# Patient Record
Sex: MALE | Race: WHITE | Employment: UNEMPLOYED | URBAN - METROPOLITAN AREA
[De-identification: names, ages, dates, MRNs, and addresses within clinical notes are randomized per-mention and may not be internally consistent; named-entity substitution may affect disease eponyms.]

---

## 2021-05-04 ENCOUNTER — OFFICE VISIT (OUTPATIENT)
Dept: INTERNAL MEDICINE CLINIC | Age: 31
End: 2021-05-04
Payer: MEDICAID

## 2021-05-04 VITALS
OXYGEN SATURATION: 96 % | HEIGHT: 73 IN | TEMPERATURE: 98 F | HEART RATE: 83 BPM | BODY MASS INDEX: 25.74 KG/M2 | RESPIRATION RATE: 15 BRPM | DIASTOLIC BLOOD PRESSURE: 64 MMHG | SYSTOLIC BLOOD PRESSURE: 125 MMHG | WEIGHT: 194.25 LBS

## 2021-05-04 DIAGNOSIS — Z00.00 WELL ADULT EXAM: ICD-10-CM

## 2021-05-04 DIAGNOSIS — R73.9 HYPERGLYCEMIA: ICD-10-CM

## 2021-05-04 DIAGNOSIS — R25.2 LEG CRAMPS: ICD-10-CM

## 2021-05-04 DIAGNOSIS — E55.9 VITAMIN D DEFICIENCY: ICD-10-CM

## 2021-05-04 DIAGNOSIS — R11.10 DRY HEAVES: Primary | ICD-10-CM

## 2021-05-04 DIAGNOSIS — F90.0 ATTENTION DEFICIT HYPERACTIVITY DISORDER (ADHD), PREDOMINANTLY INATTENTIVE TYPE: ICD-10-CM

## 2021-05-04 DIAGNOSIS — Z11.59 NEED FOR HEPATITIS C SCREENING TEST: ICD-10-CM

## 2021-05-04 DIAGNOSIS — R10.9 ABDOMINAL CRAMPING: ICD-10-CM

## 2021-05-04 DIAGNOSIS — E53.8 B12 DEFICIENCY: ICD-10-CM

## 2021-05-04 PROCEDURE — 99204 OFFICE O/P NEW MOD 45 MIN: CPT | Performed by: INTERNAL MEDICINE

## 2021-05-04 RX ORDER — DEXTROAMPHETAMINE SACCHARATE, AMPHETAMINE ASPARTATE MONOHYDRATE, DEXTROAMPHETAMINE SULFATE AND AMPHETAMINE SULFATE 5; 5; 5; 5 MG/1; MG/1; MG/1; MG/1
20 CAPSULE, EXTENDED RELEASE ORAL DAILY
Qty: 30 CAP | Refills: 0 | Status: SHIPPED | OUTPATIENT
Start: 2021-05-04 | End: 2021-08-04 | Stop reason: CLARIF

## 2021-05-04 RX ORDER — BUPRENORPHINE AND NALOXONE 8; 2 MG/1; MG/1
FILM, SOLUBLE BUCCAL; SUBLINGUAL DAILY
COMMUNITY

## 2021-05-04 RX ORDER — DEXTROAMPHETAMINE SACCHARATE, AMPHETAMINE ASPARTATE MONOHYDRATE, DEXTROAMPHETAMINE SULFATE AND AMPHETAMINE SULFATE 5; 5; 5; 5 MG/1; MG/1; MG/1; MG/1
20 CAPSULE, EXTENDED RELEASE ORAL DAILY
Qty: 30 CAP | Refills: 0 | Status: SHIPPED | OUTPATIENT
Start: 2021-07-03 | End: 2021-08-04 | Stop reason: SDUPTHER

## 2021-05-04 RX ORDER — VARENICLINE TARTRATE 25 MG
KIT ORAL
Qty: 1 DOSE PACK | Refills: 0 | Status: SHIPPED | OUTPATIENT
Start: 2021-05-04 | End: 2021-08-07 | Stop reason: ALTCHOICE

## 2021-05-04 RX ORDER — DEXTROAMPHETAMINE SACCHARATE, AMPHETAMINE ASPARTATE MONOHYDRATE, DEXTROAMPHETAMINE SULFATE AND AMPHETAMINE SULFATE 5; 5; 5; 5 MG/1; MG/1; MG/1; MG/1
20 CAPSULE, EXTENDED RELEASE ORAL DAILY
Qty: 30 CAP | Refills: 0 | Status: SHIPPED | OUTPATIENT
Start: 2021-06-03 | End: 2021-08-04 | Stop reason: SDUPTHER

## 2021-05-04 RX ORDER — DICYCLOMINE HYDROCHLORIDE 10 MG/1
10 CAPSULE ORAL
Qty: 30 CAP | Refills: 1 | Status: SHIPPED | OUTPATIENT
Start: 2021-05-04

## 2021-05-04 RX ORDER — VARENICLINE TARTRATE 1 MG/1
1 TABLET, FILM COATED ORAL 2 TIMES DAILY
Qty: 60 TAB | Refills: 5 | Status: SHIPPED | OUTPATIENT
Start: 2021-05-04 | End: 2021-08-07 | Stop reason: ALTCHOICE

## 2021-05-04 NOTE — PROGRESS NOTES
RM 13    Chief Complaint   Patient presents with    New Patient    Establish Care       1. Have you been to the ER, urgent care clinic since your last visit? Hospitalized since your last visit? No    2. Have you seen or consulted any other health care providers outside of the [address] [place] [address] since your last visit? Include any pap smears or colon screening. No    Health Maintenance Due   Topic Date Due    Hepatitis C Screening  Never done    Pneumococcal 0-64 years (1 of 1 - PPSV23) Never done    COVID-19 Vaccine (1) Never done    DTaP/Tdap/Td series (1 - Tdap) Never done       Abuse Screening Questionnaire 5/4/2021   Do you ever feel afraid of your partner? N   Are you in a relationship with someone who physically or mentally threatens you? N   Is it safe for you to go home? Y       3 most recent PHQ Screens 5/4/2021   Little interest or pleasure in doing things Not at all   Feeling down, depressed, irritable, or hopeless Not at all   Total Score PHQ 2 0       No flowsheet data found.

## 2021-05-04 NOTE — PROGRESS NOTES
HPI:  established patient  Presents to est care    A couple of months of \"dry heaving\" in the AM    Awakens at night with abd cramping    Poor weight gain despite protein supplement shakes    Varicose veins    Follows with pain management and on suboxone  +hx heroin abuse - none x 3-4 yrs    No NSAIDs    Denies acid reflux    PPD smoker since 12 yo  Pt interested in stop    Pt has ADHD - long standing since childhood. Poor attention to task  Easily distracted. Impaired at work due to ADHD    Pt has been on depakote and dx Bipolar d/o      Past medical, Social, and Family history reviewed    Prior to Admission medications    Medication Sig Start Date End Date Taking? Authorizing Provider   buprenorphine-naloxone (Suboxone) 8-2 mg film sublingaul film by SubLINGual route daily. Yes Provider, Historical   hydrocodone-acetaminophen (NORCO) 5-325 mg per tablet Take 1 tablet by mouth every six (6) hours as needed for Pain. Patient not taking: Reported on 5/4/2021 9/21/14   Alex Brown PA-C          ROS  Complete ROS reviewed and negative or stable except as noted in HPI. Physical Exam  Vitals signs and nursing note reviewed. Constitutional:       General: He is not in acute distress. HENT:      Head: Normocephalic and atraumatic. Mouth/Throat:      Pharynx: No oropharyngeal exudate. Eyes:      General: No scleral icterus. Pupils: Pupils are equal, round, and reactive to light. Neck:      Musculoskeletal: Normal range of motion and neck supple. Thyroid: No thyromegaly. Vascular: No JVD. Cardiovascular:      Rate and Rhythm: Normal rate and regular rhythm. Heart sounds: Normal heart sounds. No murmur. No friction rub. No gallop. Pulmonary:      Effort: Pulmonary effort is normal. No respiratory distress. Breath sounds: Normal breath sounds. No wheezing or rales. Abdominal:      General: Bowel sounds are normal. There is no distension.       Palpations: Abdomen is soft.      Tenderness: There is no abdominal tenderness. Musculoskeletal: Normal range of motion. Lymphadenopathy:      Cervical: No cervical adenopathy. Skin:     General: Skin is warm. Findings: No rash. Neurological:      Mental Status: He is alert and oriented to person, place, and time. Motor: No abnormal muscle tone. Coordination: Coordination normal.           Prior labs reviewed. Assessment/Plan:    ICD-10-CM ICD-9-CM    1. Dry heaves  R11.10 787.03 REFERRAL TO GASTROENTEROLOGY      CBC WITH AUTOMATED DIFF      IRON PROFILE      FERRITIN      CBC WITH AUTOMATED DIFF      IRON PROFILE      FERRITIN   2. Leg cramps  R25.2 729.82 CK      CBC WITH AUTOMATED DIFF      TSH 3RD GENERATION      T4, FREE      SED RATE (ESR)      IRON PROFILE      FERRITIN      CK      CBC WITH AUTOMATED DIFF      TSH 3RD GENERATION      T4, FREE      SED RATE (ESR)      IRON PROFILE      FERRITIN   3. Abdominal cramping  R10.9 789.00 REFERRAL TO GASTROENTEROLOGY      IRON PROFILE      FERRITIN      IRON PROFILE      FERRITIN      LIPASE      LIPASE   4. Attention deficit hyperactivity disorder (ADHD), predominantly inattentive type  F90.0 314.00 amphetamine-dextroamphetamine XR (ADDERALL XR) 20 mg XR capsule      amphetamine-dextroamphetamine XR (ADDERALL XR) 20 mg XR capsule      amphetamine-dextroamphetamine XR (ADDERALL XR) 20 mg XR capsule   5. Well adult exam  Z00.00 V70.0 HEMOGLOBIN A1C WITH EAG      LIPID PANEL      METABOLIC PANEL, COMPREHENSIVE      HEMOGLOBIN A1C WITH EAG      LIPID PANEL      METABOLIC PANEL, COMPREHENSIVE   6. Hyperglycemia  R73.9 790.29 HEMOGLOBIN A1C WITH EAG      HEMOGLOBIN A1C WITH EAG   7. Need for hepatitis C screening test  Z11.59 V73.89 HCV AB W/RFLX TO MARCO      HCV AB W/RFLX TO MARCO   8. Vitamin D deficiency  E55.9 268.9 VITAMIN D, 25 HYDROXY      VITAMIN D, 25 HYDROXY   9.  B12 deficiency  E53.8 266.2 VITAMIN B12      VITAMIN B12     Follow-up and Dispositions · Return in about 3 months (around 8/4/2021), or if symptoms worsen or fail to improve, for ADHD. results and schedule of future studies reviewed with patient  reviewed diet, exercise and weight    very strongly urged to quit smoking to reduce cardiovascular risk  cardiovascular risk and specific lipid/LDL goals reviewed  reviewed medications and side effects in detail    Ref vascular re: leg pain and varicose veins  Ref GI  Start adderall XR 20 mg daily  Defer mood management to psych  Check on Tdap   Consider pneumovax 23 at follow up        An electronic signature was used to authenticate this note.   -- Ritika Nicolas MD

## 2021-05-07 ENCOUNTER — TRANSCRIBE ORDER (OUTPATIENT)
Dept: NEUROLOGY | Age: 31
End: 2021-05-07

## 2021-05-10 ENCOUNTER — APPOINTMENT (OUTPATIENT)
Dept: INTERNAL MEDICINE CLINIC | Age: 31
End: 2021-05-10

## 2021-05-12 ENCOUNTER — TELEPHONE (OUTPATIENT)
Dept: INTERNAL MEDICINE CLINIC | Age: 31
End: 2021-05-12

## 2021-05-12 DIAGNOSIS — F90.0 ATTENTION DEFICIT HYPERACTIVITY DISORDER (ADHD), PREDOMINANTLY INATTENTIVE TYPE: ICD-10-CM

## 2021-05-12 DIAGNOSIS — F90.0 ATTENTION DEFICIT HYPERACTIVITY DISORDER (ADHD), PREDOMINANTLY INATTENTIVE TYPE: Primary | ICD-10-CM

## 2021-05-12 NOTE — TELEPHONE ENCOUNTER
Sergio Mackenzie called need a prior authorization for Adderall 20 mg pleas call them at [phone] Ref.  # [id number]

## 2021-05-12 NOTE — TELEPHONE ENCOUNTER
----- Message from Cyndy Perez sent at 5/12/2021 11:05 AM EDT -----  Regarding: Dr. Homer Pallas first and last name: Abel Austen [facility])      Reason for call: Requesting a call concerning PA for RX amphetamine-dextroamphetamine XR (ADDERALL XR) 20 mg XR capsule      Callback required yes/no and why: Yes      Best contact number(s): [phone] Ref #[id number]      Details to clarify the request:      Cyndy Perez

## 2021-05-12 NOTE — TELEPHONE ENCOUNTER
Spoke with Rupert MONREAL from [employer] and was informed that a PA was needed. PA sent to insurance today.   Key-[id number] PA Case ID: [id number] - Rx #: [id number]

## 2021-05-14 ENCOUNTER — DOCUMENTATION ONLY (OUTPATIENT)
Dept: INTERNAL MEDICINE CLINIC | Age: 31
End: 2021-05-14

## 2021-05-14 LAB
25(OH)D3+25(OH)D2 SERPL-MCNC: 30.8 NG/ML (ref 30–100)
ALBUMIN SERPL-MCNC: 4.8 G/DL (ref 4.1–5.2)
ALBUMIN/GLOB SERPL: 2.5 {RATIO} (ref 1.2–2.2)
ALP SERPL-CCNC: 70 IU/L (ref 39–117)
ALT SERPL-CCNC: 9 IU/L (ref 0–44)
AST SERPL-CCNC: 15 IU/L (ref 0–40)
BASOPHILS # BLD AUTO: 0 X10E3/UL (ref 0–0.2)
BASOPHILS NFR BLD AUTO: 1 %
BILIRUB SERPL-MCNC: 0.4 MG/DL (ref 0–1.2)
BUN SERPL-MCNC: 12 MG/DL (ref 6–20)
BUN/CREAT SERPL: 17 (ref 9–20)
CALCIUM SERPL-MCNC: 10 MG/DL (ref 8.7–10.2)
CHLORIDE SERPL-SCNC: 101 MMOL/L (ref 96–106)
CHOLEST SERPL-MCNC: 135 MG/DL (ref 100–199)
CK SERPL-CCNC: 91 U/L (ref 49–439)
CO2 SERPL-SCNC: 24 MMOL/L (ref 20–29)
CREAT SERPL-MCNC: 0.7 MG/DL (ref 0.76–1.27)
EOSINOPHIL # BLD AUTO: 0.2 X10E3/UL (ref 0–0.4)
EOSINOPHIL NFR BLD AUTO: 2 %
ERYTHROCYTE [DISTWIDTH] IN BLOOD BY AUTOMATED COUNT: 13 % (ref 11.6–15.4)
ERYTHROCYTE [SEDIMENTATION RATE] IN BLOOD BY WESTERGREN METHOD: 2 MM/HR (ref 0–15)
EST. AVERAGE GLUCOSE BLD GHB EST-MCNC: 120 MG/DL
FERRITIN SERPL-MCNC: 59 NG/ML (ref 30–400)
GLOBULIN SER CALC-MCNC: 1.9 G/DL (ref 1.5–4.5)
GLUCOSE SERPL-MCNC: 104 MG/DL (ref 65–99)
HBA1C MFR BLD: 5.8 % (ref 4.8–5.6)
HCT VFR BLD AUTO: 47.1 % (ref 37.5–51)
HCV AB S/CO SERPL IA: >11 S/CO RATIO (ref 0–0.9)
HCV RNA SERPL QL NAA+PROBE: NEGATIVE
HDLC SERPL-MCNC: 59 MG/DL
HGB BLD-MCNC: 15.6 G/DL (ref 13–17.7)
IMM GRANULOCYTES # BLD AUTO: 0 X10E3/UL (ref 0–0.1)
IMM GRANULOCYTES NFR BLD AUTO: 0 %
INTERPRETATION: NORMAL
IRON SATN MFR SERPL: 31 % (ref 15–55)
IRON SERPL-MCNC: 122 UG/DL (ref 38–169)
LDLC SERPL CALC-MCNC: 61 MG/DL (ref 0–99)
LIPASE SERPL-CCNC: 22 U/L (ref 13–78)
LYMPHOCYTES # BLD AUTO: 2.4 X10E3/UL (ref 0.7–3.1)
LYMPHOCYTES NFR BLD AUTO: 28 %
MCH RBC QN AUTO: 30.8 PG (ref 26.6–33)
MCHC RBC AUTO-ENTMCNC: 33.1 G/DL (ref 31.5–35.7)
MCV RBC AUTO: 93 FL (ref 79–97)
MONOCYTES # BLD AUTO: 0.5 X10E3/UL (ref 0.1–0.9)
MONOCYTES NFR BLD AUTO: 6 %
NEUTROPHILS # BLD AUTO: 5.3 X10E3/UL (ref 1.4–7)
NEUTROPHILS NFR BLD AUTO: 63 %
PLATELET # BLD AUTO: 159 X10E3/UL (ref 150–450)
POTASSIUM SERPL-SCNC: 4.7 MMOL/L (ref 3.5–5.2)
PROT SERPL-MCNC: 6.7 G/DL (ref 6–8.5)
RBC # BLD AUTO: 5.06 X10E6/UL (ref 4.14–5.8)
SODIUM SERPL-SCNC: 141 MMOL/L (ref 134–144)
T4 FREE SERPL-MCNC: 0.94 NG/DL (ref 0.82–1.77)
TIBC SERPL-MCNC: 399 UG/DL (ref 250–450)
TRIGL SERPL-MCNC: 78 MG/DL (ref 0–149)
TSH SERPL DL<=0.005 MIU/L-ACNC: 2.28 UIU/ML (ref 0.45–4.5)
UIBC SERPL-MCNC: 277 UG/DL (ref 111–343)
VIT B12 SERPL-MCNC: 448 PG/ML (ref 232–1245)
VLDLC SERPL CALC-MCNC: 15 MG/DL (ref 5–40)
WBC # BLD AUTO: 8.5 X10E3/UL (ref 3.4–10.8)

## 2021-05-20 ENCOUNTER — TELEPHONE (OUTPATIENT)
Dept: INTERNAL MEDICINE CLINIC | Age: 31
End: 2021-05-20

## 2021-05-20 NOTE — TELEPHONE ENCOUNTER
Called pt and identified with name and  advised pt that a drug screen is required to get approved for Detrmp. Pt informed me that he received lab order for urine drug screen in the mail today. Provided pt with [facility] phone number and hours via email so pt can schedule a weekend appt to have drug screening completed. Information sent to [email or web address]

## 2021-08-04 ENCOUNTER — OFFICE VISIT (OUTPATIENT)
Dept: INTERNAL MEDICINE CLINIC | Age: 31
End: 2021-08-04
Payer: MEDICAID

## 2021-08-04 VITALS
RESPIRATION RATE: 20 BRPM | HEIGHT: 73 IN | OXYGEN SATURATION: 97 % | HEART RATE: 79 BPM | SYSTOLIC BLOOD PRESSURE: 122 MMHG | BODY MASS INDEX: 25.63 KG/M2 | DIASTOLIC BLOOD PRESSURE: 66 MMHG | TEMPERATURE: 98 F

## 2021-08-04 DIAGNOSIS — R73.02 IGT (IMPAIRED GLUCOSE TOLERANCE): ICD-10-CM

## 2021-08-04 DIAGNOSIS — Z00.00 ROUTINE PHYSICAL EXAMINATION: ICD-10-CM

## 2021-08-04 DIAGNOSIS — F90.0 ATTENTION DEFICIT HYPERACTIVITY DISORDER (ADHD), PREDOMINANTLY INATTENTIVE TYPE: Primary | ICD-10-CM

## 2021-08-04 PROCEDURE — 99214 OFFICE O/P EST MOD 30 MIN: CPT | Performed by: INTERNAL MEDICINE

## 2021-08-04 RX ORDER — DEXTROAMPHETAMINE SACCHARATE, AMPHETAMINE ASPARTATE MONOHYDRATE, DEXTROAMPHETAMINE SULFATE AND AMPHETAMINE SULFATE 5; 5; 5; 5 MG/1; MG/1; MG/1; MG/1
20 CAPSULE, EXTENDED RELEASE ORAL DAILY
Qty: 30 CAPSULE | Refills: 0 | Status: SHIPPED | OUTPATIENT
Start: 2021-10-21 | End: 2021-11-20

## 2021-08-04 RX ORDER — CEPHALEXIN 500 MG/1
CAPSULE ORAL
COMMUNITY
Start: 2021-06-10

## 2021-08-04 RX ORDER — DEXTROAMPHETAMINE SACCHARATE, AMPHETAMINE ASPARTATE, DEXTROAMPHETAMINE SULFATE AND AMPHETAMINE SULFATE 2.5; 2.5; 2.5; 2.5 MG/1; MG/1; MG/1; MG/1
10 TABLET ORAL DAILY
Qty: 30 TABLET | Refills: 0 | Status: SHIPPED | OUTPATIENT
Start: 2021-08-22 | End: 2021-09-20

## 2021-08-04 RX ORDER — PANTOPRAZOLE SODIUM 40 MG/1
40 TABLET, DELAYED RELEASE ORAL DAILY
Qty: 30 TABLET | Refills: 5 | Status: SHIPPED | OUTPATIENT
Start: 2021-08-04

## 2021-08-04 RX ORDER — DEXTROAMPHETAMINE SACCHARATE, AMPHETAMINE ASPARTATE MONOHYDRATE, DEXTROAMPHETAMINE SULFATE AND AMPHETAMINE SULFATE 5; 5; 5; 5 MG/1; MG/1; MG/1; MG/1
20 CAPSULE, EXTENDED RELEASE ORAL DAILY
Qty: 30 CAPSULE | Refills: 0 | Status: SHIPPED | OUTPATIENT
Start: 2021-09-21 | End: 2021-10-20

## 2021-08-04 RX ORDER — DEXTROAMPHETAMINE SACCHARATE, AMPHETAMINE ASPARTATE, DEXTROAMPHETAMINE SULFATE AND AMPHETAMINE SULFATE 5; 5; 5; 5 MG/1; MG/1; MG/1; MG/1
20 TABLET ORAL DAILY
COMMUNITY
End: 2021-08-04 | Stop reason: CLARIF

## 2021-08-04 RX ORDER — DEXTROAMPHETAMINE SACCHARATE, AMPHETAMINE ASPARTATE, DEXTROAMPHETAMINE SULFATE AND AMPHETAMINE SULFATE 2.5; 2.5; 2.5; 2.5 MG/1; MG/1; MG/1; MG/1
10 TABLET ORAL DAILY
Qty: 30 TABLET | Refills: 0 | Status: SHIPPED | OUTPATIENT
Start: 2021-09-21 | End: 2021-10-20

## 2021-08-04 RX ORDER — DEXTROAMPHETAMINE SACCHARATE, AMPHETAMINE ASPARTATE, DEXTROAMPHETAMINE SULFATE AND AMPHETAMINE SULFATE 2.5; 2.5; 2.5; 2.5 MG/1; MG/1; MG/1; MG/1
10 TABLET ORAL DAILY
Qty: 30 TABLET | Refills: 0 | Status: SHIPPED | OUTPATIENT
Start: 2021-10-21 | End: 2021-11-19

## 2021-08-04 RX ORDER — DEXTROAMPHETAMINE SACCHARATE, AMPHETAMINE ASPARTATE MONOHYDRATE, DEXTROAMPHETAMINE SULFATE AND AMPHETAMINE SULFATE 5; 5; 5; 5 MG/1; MG/1; MG/1; MG/1
20 CAPSULE, EXTENDED RELEASE ORAL DAILY
Qty: 30 CAPSULE | Refills: 0 | Status: SHIPPED | OUTPATIENT
Start: 2021-08-22 | End: 2021-09-20

## 2021-08-04 NOTE — PROGRESS NOTES
RM: 16  patient is not fasting    Chief Complaint   Patient presents with    Medication Evaluation     3 month f/u    Behavioral Problem      Visit Vitals  /66 (BP 1 Location: Right arm, BP Patient Position: Sitting, BP Cuff Size: Adult)   Pulse 79   Temp 98 °F (36.7 °C) (Oral)   Resp 20   Ht 6' 1\" (1.854 m)   SpO2 97%   BMI 25.63 kg/m²     Recent Travel Screening and Travel History documentation     Travel Screening     Question   Response    In the last month, have you been in contact with someone who was confirmed or suspected to have Coronavirus / COVID-19? No / Unsure    Have you had a COVID-19 viral test in the last 14 days? No    Do you have any of the following new or worsening symptoms? None of these    Have you traveled internationally or domestically in the last month? No      Travel History   Travel since 07/04/21     No documented travel since 07/04/21                   3 most recent PHQ Screens 8/4/2021   Little interest or pleasure in doing things Several days   Feeling down, depressed, irritable, or hopeless Nearly every day   Total Score PHQ 2 4   Trouble falling or staying asleep, or sleeping too much Several days   Feeling tired or having little energy Several days   Poor appetite, weight loss, or overeating Several days   Feeling bad about yourself - or that you are a failure or have let yourself or your family down Not at all   Trouble concentrating on things such as school, work, reading, or watching TV Several days   Moving or speaking so slowly that other people could have noticed; or the opposite being so fidgety that others notice Not at all   Thoughts of being better off dead, or hurting yourself in some way Not at all   PHQ 9 Score 8   How difficult have these problems made it for you to do your work, take care of your home and get along with others Somewhat difficult            1. Have you been to the ER, urgent care clinic since your last visit?   Hospitalized since your last visit? No    2. Have you seen or consulted any other health care providers outside of the [address] [place] [address] since your last visit? Include any pap smears or colon screening. No     Health Maintenance Due   Topic Date Due    Pneumococcal 0-64 years (1 of 2 - PPSV23) Never done    COVID-19 Vaccine (1) Never done    DTaP/Tdap/Td series (1 - Tdap) Never done        No flowsheet data found.

## 2021-08-04 NOTE — PROGRESS NOTES
HPI:  established patient  Presents for f/u ADHD    Pt filled adderall XR though no insurance approval  Helps him focus and be more productive. But, pt notices it wears off in the afternoon  Works from 6 a to 6 p and takes med around 5:30 am    Pt still dry heaving    Pt has reduced sugars based on the IGT dx    Past medical, Social, and Family history reviewed    Prior to Admission medications    Medication Sig Start Date End Date Taking? Authorizing Provider   cephALEXin (KEFLEX) 500 mg capsule TAKE 1 CAPSULE BY MOUTH 3 TIMES A DAY 6/10/21  Yes Provider, Historical   dextroamphetamine-amphetamine (AdderalL) 20 mg tablet Take 20 mg by mouth daily. Yes Provider, Historical   buprenorphine-naloxone (Suboxone) 8-2 mg film sublingaul film by SubLINGual route daily. Yes Provider, Historical   dicyclomine (BENTYL) 10 mg capsule Take 1 Cap by mouth three (3) times daily as needed for Abdominal Cramps or Cramping. 5/4/21  Yes Johanna Clark MD   varenicline (CHANTIX STARTER DANDRE) 0.5 mg (11)- 1 mg (42) DsPk Per dose pack  Patient not taking: Reported on 8/4/2021 5/4/21   Johanna Clark MD   varenicline (CHANTIX) 1 mg tablet Take 1 Tab by mouth two (2) times a day. Patient not taking: Reported on 8/4/2021 5/4/21   Johanna Clark MD   hydrocodone-acetaminophen [facility]) 5-325 mg per tablet Take 1 tablet by mouth every six (6) hours as needed for Pain. Patient not taking: Reported on 5/4/2021 9/21/14   Juana Jessica PA-C          ROS  Complete ROS reviewed and negative or stable except as noted in HPI. Physical Exam  Vitals and nursing note reviewed. Constitutional:       General: He is not in acute distress. HENT:      Head: Normocephalic and atraumatic. Mouth/Throat:      Pharynx: No oropharyngeal exudate. Eyes:      General: No scleral icterus. Pupils: Pupils are equal, round, and reactive to light. Neck:      Thyroid: No thyromegaly. Vascular: No JVD.    Cardiovascular: Rate and Rhythm: Normal rate and regular rhythm. Heart sounds: Normal heart sounds. No murmur heard. No friction rub. No gallop. Pulmonary:      Effort: Pulmonary effort is normal. No respiratory distress. Breath sounds: Normal breath sounds. No wheezing or rales. Abdominal:      General: Bowel sounds are normal. There is no distension. Palpations: Abdomen is soft. Tenderness: There is no abdominal tenderness. Musculoskeletal:         General: Normal range of motion. Cervical back: Normal range of motion and neck supple. Lymphadenopathy:      Cervical: No cervical adenopathy. Skin:     General: Skin is warm. Findings: No rash. Neurological:      Mental Status: He is alert and oriented to person, place, and time. Motor: No abnormal muscle tone. Coordination: Coordination normal.           Prior labs reviewed. Reviewed Hep C Ab + but RNA negative - no tx needed. Assessment/Plan:    ICD-10-CM ICD-9-CM    1. Attention deficit hyperactivity disorder (ADHD), predominantly inattentive type  F90.0 314.00 10-DRUG SCREEN W/CONF      amphetamine-dextroamphetamine XR (ADDERALL XR) 20 mg XR capsule      amphetamine-dextroamphetamine XR (ADDERALL XR) 20 mg XR capsule      amphetamine-dextroamphetamine XR (ADDERALL XR) 20 mg XR capsule      dextroamphetamine-amphetamine (ADDERALL) 10 mg tablet      dextroamphetamine-amphetamine (ADDERALL) 10 mg tablet      dextroamphetamine-amphetamine (ADDERALL) 10 mg tablet      10-DRUG SCREEN W/CONF   2. Routine physical examination  Z00.00 V70.0    3. IGT (impaired glucose tolerance)  R73.02 790.22      Follow-up and Dispositions    · Return in about 3 months (around 11/4/2021), or if symptoms worsen or fail to improve, for ADHD.        results and schedule of future studies reviewed with patient  reviewed diet, exercise and weight    cardiovascular risk and specific lipid/LDL goals reviewed  reviewed medications and side effects in detail    Trial PPI  Add adderall IR for afternoon use        An electronic signature was used to authenticate this note.   -- Livan Llamas MD

## 2021-08-16 LAB
AMPHET UR CFM-MCNC: 4096 NG/ML
AMPHET UR QL CFM: POSITIVE
AMPHETAMINES UR QL SCN: NORMAL NG/ML
AMPHETAMINES UR QL: POSITIVE
BARBITURATES UR QL SCN: NEGATIVE NG/ML
BENZODIAZ UR QL SCN: NEGATIVE NG/ML
BZE UR QL SCN: NEGATIVE NG/ML
CANNABINOIDS UR QL CFM: POSITIVE
CANNABINOIDS UR QL SCN: NORMAL NG/ML
CREAT UR-MCNC: 186.8 MG/DL (ref 20–300)
METHADONE UR QL SCN: NEGATIVE NG/ML
METHAMPHET UR QL CFM: NEGATIVE
OPIATES UR QL SCN: NEGATIVE NG/ML
OXYCODONE+OXYMORPHONE UR QL SCN: NEGATIVE NG/ML
PCP UR QL: NEGATIVE NG/ML
PH UR: 6.3 [PH] (ref 4.5–8.9)
PLEASE NOTE:, 733157: NORMAL
PROPOXYPH UR QL SCN: NEGATIVE NG/ML
THC UR CFM-MCNC: 37 NG/ML

## 2021-11-16 ENCOUNTER — VIRTUAL VISIT (OUTPATIENT)
Dept: INTERNAL MEDICINE CLINIC | Age: 31
End: 2021-11-16
Payer: MEDICAID

## 2021-11-16 DIAGNOSIS — K29.70 GASTRITIS WITHOUT BLEEDING, UNSPECIFIED CHRONICITY, UNSPECIFIED GASTRITIS TYPE: ICD-10-CM

## 2021-11-16 DIAGNOSIS — F90.0 ATTENTION DEFICIT HYPERACTIVITY DISORDER (ADHD), PREDOMINANTLY INATTENTIVE TYPE: Primary | ICD-10-CM

## 2021-11-16 PROCEDURE — 99214 OFFICE O/P EST MOD 30 MIN: CPT | Performed by: INTERNAL MEDICINE

## 2021-11-16 RX ORDER — DEXTROAMPHETAMINE SACCHARATE, AMPHETAMINE ASPARTATE, DEXTROAMPHETAMINE SULFATE AND AMPHETAMINE SULFATE 2.5; 2.5; 2.5; 2.5 MG/1; MG/1; MG/1; MG/1
10 TABLET ORAL DAILY
Qty: 30 TABLET | Refills: 0 | Status: SHIPPED | OUTPATIENT
Start: 2022-01-15 | End: 2022-02-13

## 2021-11-16 RX ORDER — DEXTROAMPHETAMINE SACCHARATE, AMPHETAMINE ASPARTATE MONOHYDRATE, DEXTROAMPHETAMINE SULFATE AND AMPHETAMINE SULFATE 5; 5; 5; 5 MG/1; MG/1; MG/1; MG/1
20 CAPSULE, EXTENDED RELEASE ORAL DAILY
Qty: 30 CAPSULE | Refills: 0 | Status: SHIPPED | OUTPATIENT
Start: 2022-01-15 | End: 2022-02-13

## 2021-11-16 RX ORDER — DEXTROAMPHETAMINE SACCHARATE, AMPHETAMINE ASPARTATE, DEXTROAMPHETAMINE SULFATE AND AMPHETAMINE SULFATE 2.5; 2.5; 2.5; 2.5 MG/1; MG/1; MG/1; MG/1
10 TABLET ORAL DAILY
Qty: 30 TABLET | Refills: 0 | Status: SHIPPED | OUTPATIENT
Start: 2021-12-16 | End: 2022-01-14

## 2021-11-16 RX ORDER — DEXTROAMPHETAMINE SACCHARATE, AMPHETAMINE ASPARTATE MONOHYDRATE, DEXTROAMPHETAMINE SULFATE AND AMPHETAMINE SULFATE 5; 5; 5; 5 MG/1; MG/1; MG/1; MG/1
20 CAPSULE, EXTENDED RELEASE ORAL DAILY
Qty: 30 CAPSULE | Refills: 0 | Status: SHIPPED | OUTPATIENT
Start: 2021-12-16 | End: 2022-01-14

## 2021-11-16 RX ORDER — DEXTROAMPHETAMINE SACCHARATE, AMPHETAMINE ASPARTATE MONOHYDRATE, DEXTROAMPHETAMINE SULFATE AND AMPHETAMINE SULFATE 5; 5; 5; 5 MG/1; MG/1; MG/1; MG/1
20 CAPSULE, EXTENDED RELEASE ORAL DAILY
Qty: 30 CAPSULE | Refills: 0 | Status: SHIPPED | OUTPATIENT
Start: 2021-11-16 | End: 2021-12-16

## 2021-11-16 RX ORDER — DEXTROAMPHETAMINE SACCHARATE, AMPHETAMINE ASPARTATE, DEXTROAMPHETAMINE SULFATE AND AMPHETAMINE SULFATE 2.5; 2.5; 2.5; 2.5 MG/1; MG/1; MG/1; MG/1
10 TABLET ORAL DAILY
Qty: 30 TABLET | Refills: 0 | Status: SHIPPED | OUTPATIENT
Start: 2021-11-16 | End: 2021-12-15

## 2021-11-16 NOTE — PROGRESS NOTES
[phone]    Chief Complaint   Patient presents with    Medication Check     f/u     1. Have you been to the ER, urgent care clinic since your last visit? Hospitalized since your last visit? Yes Where: patient first laceration to hand    2. Have you seen or consulted any other health care providers outside of the [address] [place] [address] since your last visit? Include any pap smears or colon screening.  Yes Where: see above

## 2021-11-16 NOTE — PROGRESS NOTES
Maria Isabel Zuñiga (: 1990) is a 32 y.o. male, established patient, here for evaluation of the following chief complaint(s)--see below:    Maria Isabel Zuñiga is a 32 y.o. male who was seen by synchronous (real-time) audio-video technology on 2021. Consent: Maria Isabel Zuñiga, who was seen by synchronous (real-time) audio-video technology, and/or his healthcare decision maker, is aware that this patient-initiated, Telehealth encounter on 2021 is a billable service, with coverage as determined by his insurance carrier. He is aware that he may receive a bill and has provided verbal consent to proceed: Yes. I was in the office while conducting this encounter. Assessment & Plan:   Diagnoses and all orders for this visit:      ICD-10-CM ICD-9-CM    1. Attention deficit hyperactivity disorder (ADHD), predominantly inattentive type  F90.0 314.00 amphetamine-dextroamphetamine XR (ADDERALL XR) 20 mg XR capsule      amphetamine-dextroamphetamine XR (ADDERALL XR) 20 mg XR capsule      amphetamine-dextroamphetamine XR (ADDERALL XR) 20 mg XR capsule      dextroamphetamine-amphetamine (ADDERALL) 10 mg tablet      dextroamphetamine-amphetamine (ADDERALL) 10 mg tablet      dextroamphetamine-amphetamine (ADDERALL) 10 mg tablet   2. Gastritis without bleeding, unspecified chronicity, unspecified gastritis type  K29.70 535.50      Follow-up and Dispositions    · Return in about 3 months (around 2022), or if symptoms worsen or fail to improve, for ADHD. results and schedule of future studies reviewed with patient  reviewed diet, exercise and weight   reviewed medications and side effects in detail     Continue current medications   Resume PPI if GI sx recur      AVS:  []  Available to patient in [employer] after visit signed. [x]  Mailed to patient after visit. []  Not sent to patient after visit.          Subjective:   Maria Isabel Zuñiga was seen for:  Chief Complaint   Patient presents with   Ardyth Moritz Medication Check     f/u       Notes:    Took PPI for a month or 2 then sx improved and med stopped    adderall XR + IR working well.    +productivity and attention  No adverse effects reported. Nursing screenings reviewed by provider at visit. Past medical, Social, and Family history reviewed  Medications reviewed and updated. Allergies   Allergen Reactions    Ibuprofen Hives     NOT AN ALLERGY TO PATIENT       Prior to Admission medications    Medication Sig Start Date End Date Taking? Authorizing Provider   cephALEXin (KEFLEX) 500 mg capsule TAKE 1 CAPSULE BY MOUTH 3 TIMES A DAY 6/10/21  Yes Provider, Historical   amphetamine-dextroamphetamine XR (ADDERALL XR) 20 mg XR capsule Take 1 Capsule by mouth daily for 30 days. Max Daily Amount: 20 mg. 10/21/21 11/20/21 Yes Costa Cadena MD   dextroamphetamine-amphetamine (ADDERALL) 10 mg tablet Take 1 Tablet by mouth daily for 29 days. Max Daily Amount: 10 mg. 10/21/21 11/19/21 Yes Costa Cadena MD   buprenorphine-naloxone (Suboxone) 8-2 mg film sublingaul film by SubLINGual route daily. Yes Provider, Historical   pantoprazole (PROTONIX) 40 mg tablet Take 1 Tablet by mouth daily. Patient not taking: Reported on 11/16/2021 8/4/21   Costa Cadena MD   dicyclomine (BENTYL) 10 mg capsule Take 1 Cap by mouth three (3) times daily as needed for Abdominal Cramps or Cramping. Patient not taking: Reported on 11/16/2021 5/4/21   Costa Cadena MD         ROS     A complete ROS was performed and negative except as noted in HPI     PHYSICAL EXAMINATION:    Vital Signs: There were no vitals taken for this visit. No flowsheet data found.       Constitutional: [x] Appears well-developed and well-nourished [x] No apparent distress      Mental status: [x] Alert and awake  [x] Oriented [x] Able to follow commands       Eyes:   EOM    [x]  Normal      Sclera  [x]  Normal              Discharge [x]  None visible       HENT: [x] Normocephalic, atraumatic [x] Mouth/Throat: Mucous membranes are moist    External Ears [x] Normal      Neck: [x] No visualized mass     Pulmonary/Chest: [x] Respiratory effort normal   [x] No visualized signs of difficulty breathing or respiratory distress    Musculoskeletal:  [x] Normal range of motion of neck    Neurological:        [x] No Facial Asymmetry (Cranial nerve 7 motor function) (limited exam due to video visit)          [x] No gaze palsy     Skin:        [x] No significant exanthematous lesions or discoloration noted on facial skin             Psychiatric:       [x] Normal Affect       Other pertinent observable physical exam findings:  None. We discussed the expected course, resolution and complications of the diagnosis(es) in detail. Medication risks, benefits, costs, interactions, and alternatives were discussed as indicated. I advised him to contact the office if his condition worsens, changes or fails to improve as anticipated. He expressed understanding with the diagnosis(es) and plan. Abrahan Cadet is a 32 y.o. male who was evaluated by a video visit encounter for concerns as above. Abrahan Cadet is being evaluated by a Virtual Visit (video visit) encounter to address concerns as mentioned above. A caregiver was present when appropriate. Due to this being a TeleHealth encounter (During HIVLX-55 public health emergency), evaluation of the following organ systems was limited: Vitals/Constitutional/EENT/Resp/CV/GI//MS/Neuro/Skin/Heme-Lymph-Imm. Pursuant to the emergency declaration under the [address] [place] [address] and the [employer] Act, this Virtual Visit was conducted with patient's (and/or legal guardian's) consent, to reduce the patient's risk of exposure to COVID-19 and provide necessary medical care.   The patient (and/or legal guardian) has also been advised to contact this office for worsening conditions or problems, and seek emergency medical treatment and/or call 911 if deemed necessary. Patient identification was verified at the start of the visit: YES. Services were provided through a video synchronous discussion virtually to substitute for in-person clinic visit. Patient was located at their individual home (or other location as per patient preference). Provider was located in medical office. An electronic signature was used to authenticate this note.   -- Alhaji Colvin MD

## 2022-02-07 ENCOUNTER — OFFICE VISIT (OUTPATIENT)
Dept: INTERNAL MEDICINE CLINIC | Age: 32
End: 2022-02-07
Payer: MEDICAID

## 2022-02-07 VITALS
DIASTOLIC BLOOD PRESSURE: 79 MMHG | BODY MASS INDEX: 27.17 KG/M2 | HEIGHT: 73 IN | WEIGHT: 205 LBS | TEMPERATURE: 99 F | HEART RATE: 86 BPM | OXYGEN SATURATION: 93 % | SYSTOLIC BLOOD PRESSURE: 130 MMHG

## 2022-02-07 DIAGNOSIS — R30.0 DYSURIA: Primary | ICD-10-CM

## 2022-02-07 DIAGNOSIS — R39.15 URINARY URGENCY: ICD-10-CM

## 2022-02-07 DIAGNOSIS — F90.0 ATTENTION DEFICIT HYPERACTIVITY DISORDER (ADHD), PREDOMINANTLY INATTENTIVE TYPE: ICD-10-CM

## 2022-02-07 DIAGNOSIS — R31.0 GROSS HEMATURIA: ICD-10-CM

## 2022-02-07 LAB
BILIRUB UR QL STRIP: NEGATIVE
GLUCOSE UR-MCNC: ABNORMAL MG/DL
KETONES P FAST UR STRIP-MCNC: NEGATIVE MG/DL
PH UR STRIP: 7 [PH] (ref 4.6–8)
PROT UR QL STRIP: ABNORMAL
SP GR UR STRIP: 1.02 (ref 1–1.03)
UA UROBILINOGEN AMB POC: ABNORMAL (ref 0.2–1)
URINALYSIS CLARITY POC: CLEAR
URINALYSIS COLOR POC: YELLOW
URINE BLOOD POC: ABNORMAL
URINE LEUKOCYTES POC: ABNORMAL
URINE NITRITES POC: POSITIVE

## 2022-02-07 PROCEDURE — 81002 URINALYSIS NONAUTO W/O SCOPE: CPT | Performed by: INTERNAL MEDICINE

## 2022-02-07 PROCEDURE — 99214 OFFICE O/P EST MOD 30 MIN: CPT | Performed by: INTERNAL MEDICINE

## 2022-02-07 RX ORDER — CEFTRIAXONE 1 G/1
500 INJECTION, POWDER, FOR SOLUTION INTRAMUSCULAR; INTRAVENOUS ONCE
Qty: 1 EACH | Refills: 0 | Status: SHIPPED | COMMUNITY
Start: 2022-02-07 | End: 2022-02-07

## 2022-02-07 RX ORDER — DEXTROAMPHETAMINE SACCHARATE, AMPHETAMINE ASPARTATE, DEXTROAMPHETAMINE SULFATE AND AMPHETAMINE SULFATE 2.5; 2.5; 2.5; 2.5 MG/1; MG/1; MG/1; MG/1
10 TABLET ORAL DAILY
Qty: 30 TABLET | Refills: 0 | Status: SHIPPED | OUTPATIENT
Start: 2022-04-02 | End: 2022-05-01

## 2022-02-07 RX ORDER — DEXTROAMPHETAMINE SACCHARATE, AMPHETAMINE ASPARTATE MONOHYDRATE, DEXTROAMPHETAMINE SULFATE AND AMPHETAMINE SULFATE 5; 5; 5; 5 MG/1; MG/1; MG/1; MG/1
20 CAPSULE, EXTENDED RELEASE ORAL DAILY
Qty: 30 CAPSULE | Refills: 0 | Status: SHIPPED | OUTPATIENT
Start: 2022-04-02 | End: 2022-05-01

## 2022-02-07 RX ORDER — DEXTROAMPHETAMINE SACCHARATE, AMPHETAMINE ASPARTATE MONOHYDRATE, DEXTROAMPHETAMINE SULFATE AND AMPHETAMINE SULFATE 5; 5; 5; 5 MG/1; MG/1; MG/1; MG/1
20 CAPSULE, EXTENDED RELEASE ORAL DAILY
Qty: 30 CAPSULE | Refills: 0 | Status: SHIPPED | OUTPATIENT
Start: 2022-03-03 | End: 2022-04-02

## 2022-02-07 RX ORDER — DEXTROAMPHETAMINE SACCHARATE, AMPHETAMINE ASPARTATE MONOHYDRATE, DEXTROAMPHETAMINE SULFATE AND AMPHETAMINE SULFATE 5; 5; 5; 5 MG/1; MG/1; MG/1; MG/1
20 CAPSULE, EXTENDED RELEASE ORAL DAILY
Qty: 30 CAPSULE | Refills: 0 | Status: SHIPPED | OUTPATIENT
Start: 2022-05-01 | End: 2022-05-30

## 2022-02-07 RX ORDER — DEXTROAMPHETAMINE SACCHARATE, AMPHETAMINE ASPARTATE, DEXTROAMPHETAMINE SULFATE AND AMPHETAMINE SULFATE 2.5; 2.5; 2.5; 2.5 MG/1; MG/1; MG/1; MG/1
10 TABLET ORAL DAILY
Qty: 30 TABLET | Refills: 0 | Status: SHIPPED
Start: 2022-05-01 | End: 2022-05-09 | Stop reason: DRUGHIGH

## 2022-02-07 RX ORDER — DEXTROAMPHETAMINE SACCHARATE, AMPHETAMINE ASPARTATE, DEXTROAMPHETAMINE SULFATE AND AMPHETAMINE SULFATE 2.5; 2.5; 2.5; 2.5 MG/1; MG/1; MG/1; MG/1
10 TABLET ORAL DAILY
Qty: 30 TABLET | Refills: 0 | Status: SHIPPED | OUTPATIENT
Start: 2022-03-03 | End: 2022-04-01

## 2022-02-07 RX ORDER — CEFTRIAXONE 500 MG/1
500 INJECTION, POWDER, FOR SOLUTION INTRAMUSCULAR; INTRAVENOUS ONCE
Status: COMPLETED | OUTPATIENT
Start: 2022-02-07 | End: 2022-02-13

## 2022-02-07 RX ORDER — CEFTRIAXONE 250 MG/8ML
500 INJECTION, POWDER, FOR SOLUTION INTRAMUSCULAR; INTRAVENOUS ONCE
Qty: 1 EACH | Refills: 0
Start: 2022-02-07 | End: 2022-02-07

## 2022-02-07 RX ORDER — DOXYCYCLINE 100 MG/1
100 CAPSULE ORAL 2 TIMES DAILY
Qty: 14 CAPSULE | Refills: 0 | Status: SHIPPED
Start: 2022-02-07 | End: 2022-02-10

## 2022-02-07 NOTE — PROGRESS NOTES
HPI:  established patient  Presents for dysuria, hematuria    Mild gross hematuria  +dysuria and urgency    Took AZO - dysuria    2 new sexual contacts - unprotected. No known STI exposure. adderall ER + IR helps some  Work is busy, so had to tell if needs dose change  Ok for now. Past medical, Social, and Family history reviewed    Prior to Admission medications    Medication Sig Start Date End Date Taking? Authorizing Provider   amphetamine-dextroamphetamine XR (ADDERALL XR) 20 mg XR capsule Take 1 Capsule by mouth daily for 29 days. Max Daily Amount: 20 mg. 1/15/22 2/13/22 Yes Jess Hickman MD   dextroamphetamine-amphetamine (ADDERALL) 10 mg tablet Take 1 Tablet by mouth daily for 29 days. Max Daily Amount: 10 mg. 1/15/22 2/13/22 Yes Jess Hickman MD   buprenorphine-naloxone (Suboxone) 8-2 mg film sublingaul film by SubLINGual route daily. Yes Provider, Historical   dicyclomine (BENTYL) 10 mg capsule Take 1 Cap by mouth three (3) times daily as needed for Abdominal Cramps or Cramping. 5/4/21  Yes Jess Hickman MD   cephALEXin (KEFLEX) 500 mg capsule TAKE 1 CAPSULE BY MOUTH 3 TIMES A DAY  Patient not taking: Reported on 2/7/2022 6/10/21   Provider, Historical   pantoprazole (PROTONIX) 40 mg tablet Take 1 Tablet by mouth daily. Patient not taking: Reported on 11/16/2021 8/4/21   Jess Hickman MD          ROS  Complete ROS reviewed and negative or stable except as noted in HPI. Physical Exam  Vitals and nursing note reviewed. Constitutional:       General: He is not in acute distress. HENT:      Head: Normocephalic and atraumatic. Mouth/Throat:      Pharynx: No oropharyngeal exudate. Eyes:      General: No scleral icterus. Pupils: Pupils are equal, round, and reactive to light. Neck:      Thyroid: No thyromegaly. Vascular: No JVD. Cardiovascular:      Rate and Rhythm: Normal rate and regular rhythm. Heart sounds: Normal heart sounds. No murmur heard.   No friction rub. No gallop. Pulmonary:      Effort: Pulmonary effort is normal. No respiratory distress. Breath sounds: Normal breath sounds. No wheezing or rales. Abdominal:      General: Bowel sounds are normal. There is no distension. Palpations: Abdomen is soft. Tenderness: There is no abdominal tenderness. Genitourinary:     Comments: Deferred. Musculoskeletal:         General: Normal range of motion. Cervical back: Normal range of motion and neck supple. Lymphadenopathy:      Cervical: No cervical adenopathy. Skin:     General: Skin is warm. Findings: No rash. Neurological:      Mental Status: He is alert and oriented to person, place, and time. Motor: No abnormal muscle tone. Coordination: Coordination normal.           Prior labs reviewed. Assessment/Plan:    ICD-10-CM ICD-9-CM    1. Dysuria  R30.0 788.1 AMB POC URINALYSIS DIP STICK MANUAL W/O MICRO      CULTURE, URINE      URINALYSIS W/ RFLX MICROSCOPIC      CT/NG/T.VAGINALIS AMPLIFICATION      AK THER/PROPH/DIAG INJECTION, SUBCUT/IM      CANCELED: CEFTRIAXONE SODIUM INJECTION  MG   2. Gross hematuria  R31.0 599.71 REFERRAL TO UROLOGY   3. Urinary urgency  R39.15 788.63    4. Attention deficit hyperactivity disorder (ADHD), predominantly inattentive type  F90.0 314.00 dextroamphetamine-amphetamine (ADDERALL) 10 mg tablet      dextroamphetamine-amphetamine (ADDERALL) 10 mg tablet      dextroamphetamine-amphetamine (ADDERALL) 10 mg tablet      amphetamine-dextroamphetamine XR (ADDERALL XR) 20 mg XR capsule      amphetamine-dextroamphetamine XR (ADDERALL XR) 20 mg XR capsule      amphetamine-dextroamphetamine XR (ADDERALL XR) 20 mg XR capsule     Follow-up and Dispositions    · Return in about 4 months (around 6/7/2022), or if symptoms worsen or fail to improve.        results and schedule of future studies reviewed with patient  reviewed diet, exercise and weight    very strongly urged to quit smoking to reduce cardiovascular risk  reviewed medications and side effects in detail    CTX IM  Doxy  Check urine cx and STI's  Refill adderall ER and IR      An electronic signature was used to authenticate this note.   -- Quentin White MD

## 2022-02-07 NOTE — PROGRESS NOTES
RM 15    Chief Complaint   Patient presents with    Blood in Urine     noted blood in urine last night. started taking Azo. urinary frequecy and urgency. burning with urination        Visit Vitals  /79   Pulse 86   Temp 99 °F (37.2 °C)   Ht 6' 1\" (1.854 m)   Wt 205 lb (93 kg)   SpO2 93%   BMI 27.05 kg/m²       3 most recent PHQ Screens 2/7/2022   Little interest or pleasure in doing things Not at all   Feeling down, depressed, irritable, or hopeless Not at all   Total Score PHQ 2 0   Trouble falling or staying asleep, or sleeping too much -   Feeling tired or having little energy -   Poor appetite, weight loss, or overeating -   Feeling bad about yourself - or that you are a failure or have let yourself or your family down -   Trouble concentrating on things such as school, work, reading, or watching TV -   Moving or speaking so slowly that other people could have noticed; or the opposite being so fidgety that others notice -   Thoughts of being better off dead, or hurting yourself in some way -   PHQ 9 Score -   How difficult have these problems made it for you to do your work, take care of your home and get along with others -         1. Have you been to the ER, urgent care clinic since your last visit? Hospitalized since your last visit? Pt first for an ear infection about 1-2 months ago    2. Have you seen or consulted any other health care providers outside of the [address] [place] [address] since your last visit? Include any pap smears or colon screening. Dr. Eagle Chapin for [address] [facility] Maintenance Due   Topic Date Due    COVID-19 Vaccine (1) Never done    Pneumococcal 0-64 years (1 of 2 - PPSV23) Never done    DTaP/Tdap/Td series (1 - Tdap) Never done    Flu Vaccine (1) Never done       No flowsheet data found. AVS  education, follow up, and recommendations provided and addressed with patient.   services used to advise patient -no

## 2022-02-07 NOTE — PROGRESS NOTES
CEFTRIAXONE (ROCEPHIN) IM ADMINISTRATION    IM Ceftriaxone (Rocephin) ordered by     Dose Ordered: 500 mg    Concentration Ordered (vial size): 1 Gram    Volume  of 1% Lidocaine for Diluent: 2.1ml    Final Concentration of Rocephin with Lidocaine Added: 350 mg    Dose Ordered/Final Concentration of Rocephin (Desired/Have): 500mg/350mg    Volume Administered to Patient: 1.4ml    Site of Administration (Specify All Sites): LVL    Calculation Dose Verified By (Two Licensed Clinical Staff): Christin Saunders LPN and Donato Willis LPN    Administered By: Donato Willis LPN      Per MD order, IM Ceftriaxone (Rocephin) administered to patient. Patient tolerated procedure well. Patient waited for 15 minutes after medication administration. No reactions noted. 1% Lidocaine : [employer]  1 % Lidocaine Lot Number: [id number]  1 % Lidocaine Expiration Date: 4/2023  1 % Lidocaine NDC: [address]

## 2022-02-10 DIAGNOSIS — N39.0 URINARY TRACT INFECTION WITH HEMATURIA, SITE UNSPECIFIED: Primary | ICD-10-CM

## 2022-02-10 DIAGNOSIS — R31.9 URINARY TRACT INFECTION WITH HEMATURIA, SITE UNSPECIFIED: Primary | ICD-10-CM

## 2022-02-10 LAB
APPEARANCE UR: ABNORMAL
BACTERIA #/AREA URNS HPF: ABNORMAL /[HPF]
BACTERIA UR CULT: ABNORMAL
BILIRUB UR QL STRIP: NEGATIVE
C TRACH RRNA SPEC QL NAA+PROBE: NEGATIVE
CASTS URNS QL MICRO: ABNORMAL /LPF
COLOR UR: YELLOW
EPI CELLS #/AREA URNS HPF: ABNORMAL /HPF (ref 0–10)
GLUCOSE UR QL STRIP: NEGATIVE
HGB UR QL STRIP: ABNORMAL
KETONES UR QL STRIP: NEGATIVE
LEUKOCYTE ESTERASE UR QL STRIP: ABNORMAL
MICRO URNS: ABNORMAL
N GONORRHOEA RRNA SPEC QL NAA+PROBE: NEGATIVE
NITRITE UR QL STRIP: POSITIVE
PH UR STRIP: 7 [PH] (ref 5–7.5)
PROT UR QL STRIP: NEGATIVE
RBC #/AREA URNS HPF: ABNORMAL /HPF (ref 0–2)
SP GR UR STRIP: 1.01 (ref 1–1.03)
T VAGINALIS DNA SPEC QL NAA+PROBE: NEGATIVE
UROBILINOGEN UR STRIP-MCNC: 1 MG/DL (ref 0.2–1)
WBC #/AREA URNS HPF: >30 /HPF (ref 0–5)

## 2022-02-10 RX ORDER — CEFDINIR 300 MG/1
300 CAPSULE ORAL 2 TIMES DAILY
Qty: 14 CAPSULE | Refills: 0 | Status: SHIPPED | OUTPATIENT
Start: 2022-02-10 | End: 2022-02-17

## 2022-02-10 NOTE — PROGRESS NOTES
No sexually transmitted infection. Standard bladder infection. Bacteria that grew is sensitive to the rocephin shot we gave, but not the doxycycline. He can stop the doxycycline and take new Rx omnicef bid x 1 week. If no further blood noted following treatment, then seeing the urologist is not needed. But, if this becomes a recurrent problem, then he should see them.

## 2022-02-10 NOTE — PROGRESS NOTES
Pt made aware of following results:  No sexually transmitted infection.     Standard bladder infection. Bacteria that grew is sensitive to the rocephin shot we gave, but not the doxycycline.     He can stop the doxycycline and take new Rx omnicef bid x 1 week.     If no further blood noted following treatment, then seeing the urologist is not needed. But, if this becomes a recurrent problem, then he should see them.

## 2022-02-13 PROCEDURE — 96372 THER/PROPH/DIAG INJ SC/IM: CPT | Performed by: INTERNAL MEDICINE

## 2022-02-13 RX ADMIN — CEFTRIAXONE 500 MG: 500 INJECTION, POWDER, FOR SOLUTION INTRAMUSCULAR; INTRAVENOUS at 13:29

## 2022-05-06 ENCOUNTER — TELEPHONE (OUTPATIENT)
Dept: INTERNAL MEDICINE CLINIC | Age: 32
End: 2022-05-06

## 2022-05-06 DIAGNOSIS — F90.0 ATTENTION DEFICIT HYPERACTIVITY DISORDER (ADHD), PREDOMINANTLY INATTENTIVE TYPE: ICD-10-CM

## 2022-05-06 RX ORDER — DICYCLOMINE HYDROCHLORIDE 10 MG/1
10 CAPSULE ORAL
Qty: 30 CAPSULE | Refills: 1 | Status: CANCELLED | OUTPATIENT
Start: 2022-05-06

## 2022-05-06 RX ORDER — DEXTROAMPHETAMINE SACCHARATE, AMPHETAMINE ASPARTATE MONOHYDRATE, DEXTROAMPHETAMINE SULFATE AND AMPHETAMINE SULFATE 5; 5; 5; 5 MG/1; MG/1; MG/1; MG/1
20 CAPSULE, EXTENDED RELEASE ORAL DAILY
Qty: 30 CAPSULE | Refills: 0 | Status: CANCELLED | OUTPATIENT
Start: 2022-05-06 | End: 2022-06-04

## 2022-05-06 RX ORDER — DEXTROAMPHETAMINE SACCHARATE, AMPHETAMINE ASPARTATE, DEXTROAMPHETAMINE SULFATE AND AMPHETAMINE SULFATE 2.5; 2.5; 2.5; 2.5 MG/1; MG/1; MG/1; MG/1
10 TABLET ORAL DAILY
Qty: 30 TABLET | Refills: 0 | Status: CANCELLED | OUTPATIENT
Start: 2022-05-06 | End: 2022-06-04

## 2022-05-06 NOTE — TELEPHONE ENCOUNTER
Pt need refill on both adderall medication pt had not called the [facility] before calling her today.  Pt has VV visit on Monday 5\9\22

## 2022-05-06 NOTE — TELEPHONE ENCOUNTER
Writer contacted [facility] [employer] regarding the requested refills. Per [facility] the medications where received and can be filled for pt for a  later on 05/06/2022.     --------------------------------------------------  Last visit 02/07/2022 MD Davis Olsen   Next appointment 05/09/2022 MD Davis Olsen   Previous refill encounter(s)   05/01/2022:  - Adderall-XR 20mg #30,   - Adderall 10 mg #30     Requested Prescriptions     Pending Prescriptions Disp Refills    amphetamine-dextroamphetamine XR (ADDERALL XR) 20 mg XR capsule 30 Capsule 0     Sig: Take 1 Capsule by mouth daily for 29 days. Max Daily Amount: 20 mg.    dicyclomine (BENTYL) 10 mg capsule 30 Capsule 1     Sig: Take 1 Capsule by mouth three (3) times daily as needed for Abdominal Cramps or Cramping.             ----- Message from Xiao Hudson sent at 5/5/2022  6:19 PM EDT -----  Subject: Refill Request    QUESTIONS  Name of Medication? amphetamine-dextroamphetamine XR (ADDERALL XR) 20 mg   XR capsule  Patient-reported dosage and instructions? once daily   How many days do you have left? 0  Preferred Pharmacy? "[facility] [id number]  Pharmacy phone number (if available)? [phone]  ---------------------------------------------------------------------------  --------------,  Name of Medication? dextroamphetamine-amphetamine (ADDERALL) 10 mg tablet  Patient-reported dosage and instructions? once daily   How many days do you have left? 0  Preferred Pharmacy? [facility] [id number]  Pharmacy phone number (if available)? [phone]  Additional Information for Provider? pt is supposed to get a three month   supply for the 10mg and 20mg but the pharmacy only has a two month supply. asked to have three month reply.   ---------------------------------------------------------------------------  --------------  Sly MILLAN  What is the best way for the office to contact you? OK to leave message on   voicemail  Preferred Call Back Phone Number? [phone]  ---------------------------------------------------------------------------  --------------  SCRIPT ANSWERS  Relationship to Patient?  Self

## 2022-05-09 ENCOUNTER — VIRTUAL VISIT (OUTPATIENT)
Dept: INTERNAL MEDICINE CLINIC | Age: 32
End: 2022-05-09
Payer: MEDICAID

## 2022-05-09 DIAGNOSIS — R45.4 IRRITABILITY: ICD-10-CM

## 2022-05-09 DIAGNOSIS — F32.A DEPRESSION, UNSPECIFIED DEPRESSION TYPE: ICD-10-CM

## 2022-05-09 DIAGNOSIS — F90.0 ATTENTION DEFICIT HYPERACTIVITY DISORDER (ADHD), PREDOMINANTLY INATTENTIVE TYPE: Primary | ICD-10-CM

## 2022-05-09 PROCEDURE — 99214 OFFICE O/P EST MOD 30 MIN: CPT | Performed by: INTERNAL MEDICINE

## 2022-05-09 RX ORDER — DEXTROAMPHETAMINE SACCHARATE, AMPHETAMINE ASPARTATE, DEXTROAMPHETAMINE SULFATE AND AMPHETAMINE SULFATE 3.75; 3.75; 3.75; 3.75 MG/1; MG/1; MG/1; MG/1
15 TABLET ORAL DAILY
Qty: 30 TABLET | Refills: 0 | Status: SHIPPED | OUTPATIENT
Start: 2022-07-08 | End: 2022-08-06

## 2022-05-09 RX ORDER — DEXTROAMPHETAMINE SACCHARATE, AMPHETAMINE ASPARTATE MONOHYDRATE, DEXTROAMPHETAMINE SULFATE AND AMPHETAMINE SULFATE 5; 5; 5; 5 MG/1; MG/1; MG/1; MG/1
20 CAPSULE, EXTENDED RELEASE ORAL DAILY
Qty: 30 CAPSULE | Refills: 0 | Status: SHIPPED | OUTPATIENT
Start: 2022-07-08 | End: 2022-08-06

## 2022-05-09 RX ORDER — DEXTROAMPHETAMINE SACCHARATE, AMPHETAMINE ASPARTATE, DEXTROAMPHETAMINE SULFATE AND AMPHETAMINE SULFATE 3.75; 3.75; 3.75; 3.75 MG/1; MG/1; MG/1; MG/1
15 TABLET ORAL DAILY
Qty: 30 TABLET | Refills: 0 | Status: SHIPPED | OUTPATIENT
Start: 2022-05-09 | End: 2022-06-07

## 2022-05-09 RX ORDER — DEXTROAMPHETAMINE SACCHARATE, AMPHETAMINE ASPARTATE, DEXTROAMPHETAMINE SULFATE AND AMPHETAMINE SULFATE 3.75; 3.75; 3.75; 3.75 MG/1; MG/1; MG/1; MG/1
15 TABLET ORAL DAILY
Qty: 30 TABLET | Refills: 0 | Status: SHIPPED | OUTPATIENT
Start: 2022-06-08 | End: 2022-07-07

## 2022-05-09 RX ORDER — DEXTROAMPHETAMINE SACCHARATE, AMPHETAMINE ASPARTATE MONOHYDRATE, DEXTROAMPHETAMINE SULFATE AND AMPHETAMINE SULFATE 5; 5; 5; 5 MG/1; MG/1; MG/1; MG/1
20 CAPSULE, EXTENDED RELEASE ORAL DAILY
Qty: 30 CAPSULE | Refills: 0 | Status: SHIPPED | OUTPATIENT
Start: 2022-06-08 | End: 2022-07-07

## 2022-05-09 RX ORDER — DEXTROAMPHETAMINE SACCHARATE, AMPHETAMINE ASPARTATE MONOHYDRATE, DEXTROAMPHETAMINE SULFATE AND AMPHETAMINE SULFATE 5; 5; 5; 5 MG/1; MG/1; MG/1; MG/1
20 CAPSULE, EXTENDED RELEASE ORAL DAILY
Qty: 30 CAPSULE | Refills: 0 | Status: SHIPPED | OUTPATIENT
Start: 2022-05-09 | End: 2022-06-08

## 2022-05-09 RX ORDER — ESCITALOPRAM OXALATE 10 MG/1
10 TABLET ORAL DAILY
Qty: 30 TABLET | Refills: 5 | Status: SHIPPED | OUTPATIENT
Start: 2022-05-09

## 2022-05-09 NOTE — PROGRESS NOTES
Freddy Vann (: 1990) is a 32 y.o. male, established patient, here for evaluation of the following chief complaint(s)--see below:    Freddy Vann is a 32 y.o. male who was seen by synchronous (real-time) audio-video technology on 2022. Consent: Freddy Vann, who was seen by synchronous (real-time) audio-video technology, and/or his healthcare decision maker, is aware that this patient-initiated, Telehealth encounter on 2022 is a billable service, with coverage as determined by his insurance carrier. He is aware that he may receive a bill and has provided verbal consent to proceed: Yes. I was in the office while conducting this encounter. Assessment & Plan:   Diagnoses and all orders for this visit:    Consider Bipolar 2 dx vs depression alone  ADHD dosing not ideal      ICD-10-CM ICD-9-CM    1. Attention deficit hyperactivity disorder (ADHD), predominantly inattentive type  F90.0 314.00 amphetamine-dextroamphetamine XR (ADDERALL XR) 20 mg XR capsule      amphetamine-dextroamphetamine XR (ADDERALL XR) 20 mg XR capsule      amphetamine-dextroamphetamine XR (ADDERALL XR) 20 mg XR capsule      dextroamphetamine-amphetamine (ADDERALL) 15 mg tablet      dextroamphetamine-amphetamine (ADDERALL) 15 mg tablet      dextroamphetamine-amphetamine (ADDERALL) 15 mg tablet   2. Depression, unspecified depression type  F32. A 311 escitalopram oxalate (LEXAPRO) 10 mg tablet   3. Irritability  R45.4 799.22 escitalopram oxalate (LEXAPRO) 10 mg tablet     Follow-up and Dispositions    · Return in about 6 weeks (around 2022), or if symptoms worsen or fail to improve, for ADHD.         results and schedule of future studies reviewed with patient  reviewed diet, exercise and weight   reviewed medications and side effects in detail     refill adderall XR + IR - increase IR dose to 15 mg prn  lexapro trial  Consider Bipolar 2 dx - may need mood stabilizer  Consider ref to psych if bipolar 2 dx seems more likely  Pt counseled re: worsened symptoms with SSRI  If clearly more irritable on SSRI, then consider mood stabilizer and psych referral.      AVS:  [x]  [facility] signed. []  Mailed to patient after visit. []  Not sent to patient after visit. Subjective:   Blima Saltness was seen for:  Chief Complaint   Patient presents with    Medication Evaluation     Notes:      Pt reports doing fair. Poor attention in the afternoon. At times taking 20 mg IR dosing to maintain focus  XR dosing seems ok. Increased stress and depression sx in recent months  Pt away from his son, 3 yo. Increased irritability and difficulty maintaining a job due to outbursts  Had zoloft as a child along with depakote. Pt not sure if there was concern for bipolar d/o dx at that time. Pt previously followed with [address] [place] [address] Pkwy. No SI, [place]    Nursing screenings reviewed by provider at visit. Past medical, Social, and Family history reviewed  Medications reviewed and updated. Allergies   Allergen Reactions    Ibuprofen Hives     NOT AN ALLERGY TO PATIENT       Prior to Admission medications    Medication Sig Start Date End Date Taking? Authorizing Provider   dextroamphetamine-amphetamine (ADDERALL) 10 mg tablet Take 1 Tablet by mouth daily for 29 days. Max Daily Amount: 10 mg. 5/1/22 5/30/22 Yes Ayde Gilmore MD   amphetamine-dextroamphetamine XR (ADDERALL XR) 20 mg XR capsule Take 1 Capsule by mouth daily for 29 days. Max Daily Amount: 20 mg. 5/1/22 5/30/22 Yes Ayde Gilmore MD   buprenorphine-naloxone (Suboxone) 8-2 mg film sublingaul film by SubLINGual route daily. Yes Provider, Historical   dicyclomine (BENTYL) 10 mg capsule Take 1 Cap by mouth three (3) times daily as needed for Abdominal Cramps or Cramping.  5/4/21  Yes Ayde Gilmore MD   cephALEXin (KEFLEX) 500 mg capsule TAKE 1 CAPSULE BY MOUTH 3 TIMES A DAY  Patient not taking: Reported on 2/7/2022 6/10/21   Provider, Historical   pantoprazole (PROTONIX) 40 mg tablet Take 1 Tablet by mouth daily. Patient not taking: Reported on 11/16/2021 8/4/21   Melissa Roman MD         ROS     A complete ROS was performed and negative except as noted in HPI     PHYSICAL EXAMINATION:    Vital Signs: There were no vitals taken for this visit. No flowsheet data found. Constitutional: [x] Appears well-developed and well-nourished [x] No apparent distress      Mental status: [x] Alert and awake  [x] Oriented [x] Able to follow commands       Eyes:   EOM    [x]  Normal      Sclera  [x]  Normal              Discharge [x]  None visible       HENT: [x] Normocephalic, atraumatic    [x] Mouth/Throat: Mucous membranes are moist    External Ears [x] Normal      Neck: [x] No visualized mass     Pulmonary/Chest: [x] Respiratory effort normal   [x] No visualized signs of difficulty breathing or respiratory distress    Musculoskeletal:  [x] Normal range of motion of neck    Neurological:        [x] No Facial Asymmetry (Cranial nerve 7 motor function) (limited exam due to video visit)          [x] No gaze palsy     Skin:        [x] No significant exanthematous lesions or discoloration noted on facial skin             Psychiatric:       [x] Normal Affect       Other pertinent observable physical exam findings:  None. We discussed the expected course, resolution and complications of the diagnosis(es) in detail. Medication risks, benefits, costs, interactions, and alternatives were discussed as indicated. I advised him to contact the office if his condition worsens, changes or fails to improve as anticipated. He expressed understanding with the diagnosis(es) and plan. Verner Plunk is a 32 y.o. male who was evaluated by a video visit encounter for concerns as above. Verner Plunk is being evaluated by a Virtual Visit (video visit) encounter to address concerns as mentioned above.   A caregiver was present when appropriate. Due to this being a TeleHealth encounter (During KXQWS-04 public health emergency), evaluation of the following organ systems was limited: Vitals/Constitutional/EENT/Resp/CV/GI//MS/Neuro/Skin/Heme-Lymph-Imm. Pursuant to the emergency declaration under the [address] [place] [address], [address] [place] [address] and the [employer] Act, this Virtual Visit was conducted with patient's (and/or legal guardian's) consent, to reduce the patient's risk of exposure to COVID-19 and provide necessary medical care. The patient (and/or legal guardian) has also been advised to contact this office for worsening conditions or problems, and seek emergency medical treatment and/or call 911 if deemed necessary. Patient identification was verified at the start of the visit: YES. Services were provided through a video synchronous discussion virtually to substitute for in-person clinic visit. Patient was located at their individual home (or other location as per patient preference). Provider was located in medical office. An electronic signature was used to authenticate this note.   -- Sebastian Vinson MD

## 2022-05-09 NOTE — PROGRESS NOTES
VV    Chief Complaint   Patient presents with    Medication Evaluation       There were no vitals taken for this visit. 3 most recent PHQ Screens 2/7/2022   Little interest or pleasure in doing things Not at all   Feeling down, depressed, irritable, or hopeless Not at all   Total Score PHQ 2 0   Trouble falling or staying asleep, or sleeping too much -   Feeling tired or having little energy -   Poor appetite, weight loss, or overeating -   Feeling bad about yourself - or that you are a failure or have let yourself or your family down -   Trouble concentrating on things such as school, work, reading, or watching TV -   Moving or speaking so slowly that other people could have noticed; or the opposite being so fidgety that others notice -   Thoughts of being better off dead, or hurting yourself in some way -   PHQ 9 Score -   How difficult have these problems made it for you to do your work, take care of your home and get along with others -         1. Have you been to the ER, urgent care clinic since your last visit? Hospitalized since your last visit? No    2. Have you seen or consulted any other health care providers outside of the [address] [place] [address] since your last visit? Include any pap smears or colon screening. Dr Marilia Ryan for soboxone     Health Maintenance Due   Topic Date Due    COVID-19 Vaccine (1) Never done    Pneumococcal 0-64 years (1 - PCV) Never done    DTaP/Tdap/Td series (1 - Tdap) Never done    A1C test (Diabetic or Prediabetic)  05/10/2022       No flowsheet data found. AVS  education, follow up, and recommendations provided and addressed with patient.   services used to advise patient -no

## 2022-09-06 DIAGNOSIS — F90.0 ATTENTION DEFICIT HYPERACTIVITY DISORDER (ADHD), PREDOMINANTLY INATTENTIVE TYPE: Primary | ICD-10-CM

## 2022-09-06 NOTE — TELEPHONE ENCOUNTER
Duplicate request: Lexapro 10 mg #30 with 5 refills was sent to [address] [place] [address] on 05/09/2022. Last visit 05/09/2022 MD Salvatore Hicks   Next appointment Nothing scheduled   Previous refill encounter(s)   07/08/2022:  - Adderall 15 mg #30,   - Adderall-XR #30    No access to [place] [address] Only    Intervention Detail: Discontinued Rx: 1, reason: Duplicate Therapy and New Rx: 2, reason: Patient Preference  Time Spent (min): 5      Requested Prescriptions     Pending Prescriptions Disp Refills    amphetamine-dextroamphetamine XR (ADDERALL XR) 20 mg XR capsule 30 Capsule 0     Sig: Take 1 Capsule by mouth every morning. Max Daily Amount: 20 mg.    dextroamphetamine-amphetamine (ADDERALL) 15 mg tablet 30 Tablet 0     Sig: Take 1 Tablet by mouth daily. Max Daily Amount: 15 mg.       ----- Message from Nancy Grewal sent at 9/6/2022 10:03 AM EDT -----  Subject: Refill Request    QUESTIONS  Name of Medication? amphetamine-dextroamphetamine XR (ADDERALL XR) 20 mg   XR capsule  Patient-reported dosage and instructions? 20mg   How many days do you have left? 0  Preferred Pharmacy? [facility] [id number]  Pharmacy phone number (if available)? [phone]  Additional Information for Provider? Pt is out of medication since last   week .  ---------------------------------------------------------------------------  --------------,  Name of Medication? dextroamphetamine-amphetamine (ADDERALL) 15 mg tablet  Patient-reported dosage and instructions? 15mg   How many days do you have left? 0  Preferred Pharmacy? [email or web address] [id number]  Pharmacy phone number (if available)? [phone]  Additional Information for Provider? Pt is out of medication since last   week .  ---------------------------------------------------------------------------  --------------,  Name of Medication? escitalopram oxalate (LEXAPRO) 10 mg tablet  Patient-reported dosage and instructions? 10mg  How many days do you have left?  0  Preferred Pharmacy? [facility] [id number]  Pharmacy phone number (if available)? [phone]  Additional Information for Provider? Pt is out of medication since last   week .  ---------------------------------------------------------------------------  --------------  CALL BACK INFO  What is the best way for the office to contact you? OK to leave message on   voicemail  Preferred Call Back Phone Number? [phone]  ---------------------------------------------------------------------------  --------------  SCRIPT ANSWERS  Relationship to Patient?  Self

## 2022-09-08 NOTE — TELEPHONE ENCOUNTER
Patient seeing mental health provider who prescribes Suboxone. Will defer Adderral refill to this provider.  Advise to establish care from list of PCPs provided

## 2022-09-09 RX ORDER — DEXTROAMPHETAMINE SACCHARATE, AMPHETAMINE ASPARTATE MONOHYDRATE, DEXTROAMPHETAMINE SULFATE AND AMPHETAMINE SULFATE 5; 5; 5; 5 MG/1; MG/1; MG/1; MG/1
20 CAPSULE, EXTENDED RELEASE ORAL
Qty: 30 CAPSULE | Refills: 0 | Status: SHIPPED | OUTPATIENT
Start: 2022-09-09

## 2022-09-09 RX ORDER — DEXTROAMPHETAMINE SACCHARATE, AMPHETAMINE ASPARTATE, DEXTROAMPHETAMINE SULFATE AND AMPHETAMINE SULFATE 3.75; 3.75; 3.75; 3.75 MG/1; MG/1; MG/1; MG/1
15 TABLET ORAL DAILY
Qty: 30 TABLET | Refills: 0 | Status: SHIPPED | OUTPATIENT
Start: 2022-09-09

## 2022-09-09 NOTE — TELEPHONE ENCOUNTER
----- Message from Robert Panchal sent at 9/6/2022  3:53 PM EDT -----  Subject: Message to Provider    QUESTIONS  Information for Provider? Patient is calling regarding the prescription of   Lexapro, and the Adderall 20m extended release, needing to be sent over to   the [facility].  He checking the status, and would like to know if he   needs to get scheduled in with a new provider as well.  ---------------------------------------------------------------------------  --------------  [id number] [employer]  [phone]; OK to leave message on voicemail  ---------------------------------------------------------------------------  --------------  SCRIPT ANSWERS  undefined

## 2022-09-09 NOTE — TELEPHONE ENCOUNTER
Refill request(s) approved--Adderall--XR and IR. PCP:  Yennifer Tovar MD      Prescription Monitoring Program ([place]) database query with fills:      Requested Prescriptions     Signed Prescriptions Disp Refills    amphetamine-dextroamphetamine XR (ADDERALL XR) 20 mg XR capsule 30 Capsule 0     Sig: Take 1 Capsule by mouth every morning. Max Daily Amount: 20 mg. Authorizing Provider: Karen Carballo    dextroamphetamine-amphetamine (ADDERALL) 15 mg tablet 30 Tablet 0     Sig: Take 1 Tablet by mouth daily. Max Daily Amount: 15 mg. Authorizing Provider: Karen Carballo     Reviewed with NP Kevin Neville and plan 30-day only refill for medications above. Plan with Prior PCP here was for psych referral and agree with this with concurrent Suboxone and ADHD mgt. PCP above had filled ADHD medications prior with current Suboxone.

## 2024-06-30 ENCOUNTER — HOSPITAL ENCOUNTER (EMERGENCY)
Facility: HOSPITAL | Age: 34
Discharge: HOME | End: 2024-06-30
Attending: STUDENT IN AN ORGANIZED HEALTH CARE EDUCATION/TRAINING PROGRAM
Payer: MEDICAID

## 2024-06-30 VITALS
DIASTOLIC BLOOD PRESSURE: 82 MMHG | BODY MASS INDEX: 25.18 KG/M2 | TEMPERATURE: 98.5 F | HEIGHT: 73 IN | OXYGEN SATURATION: 100 % | HEART RATE: 85 BPM | WEIGHT: 190 LBS | SYSTOLIC BLOOD PRESSURE: 136 MMHG | RESPIRATION RATE: 16 BRPM

## 2024-06-30 DIAGNOSIS — H65.93 BILATERAL NON-SUPPURATIVE OTITIS MEDIA: Primary | ICD-10-CM

## 2024-06-30 DIAGNOSIS — H60.393 INFECTIVE OTITIS EXTERNA OF BOTH EARS: ICD-10-CM

## 2024-06-30 PROCEDURE — 6370000000 HC RX 637 (ALT 250 FOR IP): Performed by: PHYSICIAN ASSISTANT

## 2024-06-30 PROCEDURE — 99283 EMERGENCY DEPT VISIT LOW MDM: CPT

## 2024-06-30 RX ORDER — CIPROFLOXACIN HYDROCHLORIDE 3.5 MG/ML
1 SOLUTION/ DROPS TOPICAL
Status: COMPLETED | OUTPATIENT
Start: 2024-06-30 | End: 2024-06-30

## 2024-06-30 RX ORDER — AMOXICILLIN AND CLAVULANATE POTASSIUM 875; 125 MG/1; MG/1
1 TABLET, FILM COATED ORAL 2 TIMES DAILY
Qty: 14 TABLET | Refills: 0 | Status: SHIPPED | OUTPATIENT
Start: 2024-06-30 | End: 2024-07-07

## 2024-06-30 RX ORDER — AMOXICILLIN AND CLAVULANATE POTASSIUM 875; 125 MG/1; MG/1
1 TABLET, FILM COATED ORAL
Status: COMPLETED | OUTPATIENT
Start: 2024-06-30 | End: 2024-06-30

## 2024-06-30 RX ORDER — CIPROFLOXACIN AND DEXAMETHASONE 3; 1 MG/ML; MG/ML
4 SUSPENSION/ DROPS AURICULAR (OTIC) 2 TIMES DAILY
Qty: 3 ML | Refills: 0 | Status: SHIPPED | OUTPATIENT
Start: 2024-06-30 | End: 2024-07-07

## 2024-06-30 RX ADMIN — AMOXICILLIN AND CLAVULANATE POTASSIUM 1 TABLET: 875; 125 TABLET, FILM COATED ORAL at 19:29

## 2024-06-30 RX ADMIN — CIPROFLOXACIN 1 DROP: 3 SOLUTION OPHTHALMIC at 19:29

## 2024-06-30 NOTE — ED TRIAGE NOTES
Patient arrives to ED ambulatory w/o difficulty. No acute distress noted in triage. A&O x 4. Skin is warm, dry & intact on obs. Pt reports ear pain for \"a while\" has been on ear drops and oral antibiotics without relief.

## 2024-06-30 NOTE — DISCHARGE INSTRUCTIONS
Please follow-up with ear nose and throat doctor and  your primary care.  Thank you for allowing us to provide you with medical care today.  We realize that you have many choices for your emergency care needs.  We thank you for choosing [facility].  Please choose us in the future for any continued health care needs.     The exam and treatment you received in the Emergency Department were for an emergent problem and are not intended as complete care. It is important that you follow up with a doctor, nurse practitioner, or physician's assistant for ongoing care. If your symptoms worsen or you do not improve as expected and you are unable to reach your usual health care provider, you should return to the Emergency Department. We are available 24 hours a day.     Please make an appointment with your health care provider(s) for follow up of your Emergency Department visit.  Take this sheet with you when you go to your follow-up visit.

## 2024-06-30 NOTE — ED NOTES
DC instructions reviewed with pt. Pt verbalizes understanding of instructions, f/u care, and e-scribe RX x2. Pt ambulatory with steady gait to ED lobby to leave accompanied by significant other.

## 2024-07-01 NOTE — ED PROVIDER NOTES
[facility] [place]  EMERGENCY DEPARTMENT ENCOUNTER      Pt Name: Nael Teran  MRN: [id number]  Birthdate 1990  Date of evaluation: 6/30/2024  Provider: Alana Rosales PA-C    CHIEF COMPLAINT       Chief Complaint   Patient presents with    Otalgia         HISTORY OF PRESENT ILLNESS   (Location/Symptom, Timing/Onset, Context/Setting, Quality, Duration, Modifying Factors, Severity)  Note limiting factors.   33-year-old male who presents with bilateral ear pain intermittently for the past 3 months..  He describes clear drainage from bilateral ears for the past week.  Patient states he has history of prediabetes but has not had lab work in the past year.  He denies any fevers, headache, nausea vomiting sore throat.  Patient states while incarcerated he was given antibiotics for bilateral ear pain which never resolved.            Review of External Medical Records:     Nursing Notes were reviewed.    REVIEW OF SYSTEMS    (2-9 systems for level 4, 10 or more for level 5)     Review of Systems   Constitutional:  Negative for activity change, chills and fever.   HENT:  Positive for ear pain. Negative for congestion, sinus pain and sore throat.    Respiratory:  Negative for cough, shortness of breath and wheezing.    Cardiovascular:  Negative for chest pain, palpitations and leg swelling.   Gastrointestinal:  Negative for abdominal pain, diarrhea, nausea and vomiting.   Genitourinary:  Negative for dysuria, hematuria and urgency.   Skin:  Negative for pallor, rash and wound.   Neurological:  Negative for dizziness, weakness, numbness and headaches.       Except as noted above the remainder of the review of systems was reviewed and negative.       PAST MEDICAL HISTORY   History reviewed. No pertinent past medical history.      SURGICAL HISTORY     History reviewed. No pertinent surgical history.      CURRENT MEDICATIONS       Discharge Medication List as of 6/30/2024  7:21 PM        CONTINUE these